# Patient Record
Sex: FEMALE | Race: WHITE
[De-identification: names, ages, dates, MRNs, and addresses within clinical notes are randomized per-mention and may not be internally consistent; named-entity substitution may affect disease eponyms.]

---

## 2020-05-08 ENCOUNTER — HOSPITAL ENCOUNTER (EMERGENCY)
Dept: HOSPITAL 4 - SED | Age: 85
Discharge: ANOTHER HOSPITAL | End: 2020-05-08
Payer: MEDICARE

## 2020-05-08 VITALS — SYSTOLIC BLOOD PRESSURE: 114 MMHG

## 2020-05-08 VITALS — BODY MASS INDEX: 23.92 KG/M2 | HEIGHT: 62 IN | WEIGHT: 130 LBS

## 2020-05-08 VITALS — SYSTOLIC BLOOD PRESSURE: 110 MMHG
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
## 2020-05-08 DIAGNOSIS — S00.03XA: Primary | ICD-10-CM
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
## 2020-05-08 DIAGNOSIS — M25.552: ICD-10-CM
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
## 2020-05-08 LAB
ALBUMIN SERPL BCP-MCNC: 3.4 G/DL (ref 3.4–4.8)
ALT SERPL W P-5'-P-CCNC: 26 U/L (ref 12–78)
ANION GAP SERPL CALCULATED.3IONS-SCNC: 12 MMOL/L (ref 5–15)
AST SERPL W P-5'-P-CCNC: 32 U/L (ref 10–37)
BASOPHILS # BLD AUTO: 0.1 K/UL (ref 0–0.2)
BASOPHILS NFR BLD AUTO: 1.2 % (ref 0–2)
BILIRUB SERPL-MCNC: 0.7 MG/DL (ref 0–1)
BUN SERPL-MCNC: 29 MG/DL (ref 8–21)
CALCIUM SERPL-MCNC: 8.6 MG/DL (ref 8.4–11)
CHLORIDE SERPL-SCNC: 99 MMOL/L (ref 98–107)
CREAT SERPL-MCNC: 1.07 MG/DL (ref 0.55–1.3)
EOSINOPHIL # BLD AUTO: 0.2 K/UL (ref 0–0.4)
EOSINOPHIL NFR BLD AUTO: 3.4 % (ref 0–4)
ERYTHROCYTE [DISTWIDTH] IN BLOOD BY AUTOMATED COUNT: 26.4 % (ref 9–15)
GFR SERPL CREATININE-BSD FRML MDRD: (no result) ML/MIN (ref 90–?)
GLUCOSE SERPL-MCNC: 126 MG/DL (ref 70–99)
HCT VFR BLD AUTO: 33.3 % (ref 36–48)
HGB BLD-MCNC: 10.4 G/DL (ref 12–16)
INR PPP: 1.7 (ref 0.8–1.2)
LYMPHOCYTES # BLD AUTO: 1.5 K/UL (ref 1–5.5)
LYMPHOCYTES NFR BLD AUTO: 20.8 % (ref 20.5–51.5)
MCH RBC QN AUTO: 28 PG (ref 27–31)
MCHC RBC AUTO-ENTMCNC: 31 % (ref 32–36)
MCV RBC AUTO: 91 FL (ref 79–98)
MONOCYTES # BLD MANUAL: 0.8 K/UL (ref 0–1)
MONOCYTES # BLD MANUAL: 11.3 % (ref 1.7–9.3)
NEUTROPHILS # BLD AUTO: 4.6 K/UL (ref 1.8–7.7)
NEUTROPHILS NFR BLD AUTO: 63.3 % (ref 40–70)
PLATELET # BLD AUTO: 232 K/UL (ref 130–430)
POTASSIUM SERPL-SCNC: 4.1 MMOL/L (ref 3.5–5.1)
PROTHROMBIN TIME: 16.9 SECS (ref 9.5–12.5)
RBC # BLD AUTO: 3.66 MIL/UL (ref 4.2–6.2)
SODIUM SERPLBLD-SCNC: 133 MMOL/L (ref 136–145)
WBC # BLD AUTO: 7.2 K/UL (ref 4.8–10.8)

## 2020-05-08 PROCEDURE — 96375 TX/PRO/DX INJ NEW DRUG ADDON: CPT

## 2020-05-08 PROCEDURE — 85610 PROTHROMBIN TIME: CPT

## 2020-05-08 PROCEDURE — 70450 CT HEAD/BRAIN W/O DYE: CPT

## 2020-05-08 PROCEDURE — 96374 THER/PROPH/DIAG INJ IV PUSH: CPT

## 2020-05-08 PROCEDURE — 72125 CT NECK SPINE W/O DYE: CPT

## 2020-05-08 PROCEDURE — 80053 COMPREHEN METABOLIC PANEL: CPT

## 2020-05-08 PROCEDURE — 86901 BLOOD TYPING SEROLOGIC RH(D): CPT

## 2020-05-08 PROCEDURE — 85025 COMPLETE CBC W/AUTO DIFF WBC: CPT

## 2020-05-08 PROCEDURE — 36415 COLL VENOUS BLD VENIPUNCTURE: CPT

## 2020-05-08 PROCEDURE — 86900 BLOOD TYPING SEROLOGIC ABO: CPT

## 2020-05-08 PROCEDURE — 86886 COOMBS TEST INDIRECT TITER: CPT

## 2020-05-08 PROCEDURE — 72192 CT PELVIS W/O DYE: CPT

## 2020-05-08 PROCEDURE — 71045 X-RAY EXAM CHEST 1 VIEW: CPT

## 2020-05-08 PROCEDURE — 93005 ELECTROCARDIOGRAM TRACING: CPT

## 2020-05-08 PROCEDURE — 85730 THROMBOPLASTIN TIME PARTIAL: CPT

## 2020-05-08 PROCEDURE — 99285 EMERGENCY DEPT VISIT HI MDM: CPT

## 2020-05-08 NOTE — NUR
report received from Jodie GAVIRIA. Pt is awake and alert. PT is s/p fall. 
Deformity to the LLE observed. LLE is shortened and rotated outward. Pt has two 
skin tears to BUE extremities. Medicated w/ Morphine and Zofran IVP. Will 
continue to monitor.

## 2020-05-08 NOTE — NUR
Pt being transfered to [facility] under the care of Dr. Reyes. 
Report called to KHADRA Miller in the ER.

## 2020-05-08 NOTE — NUR
Patient to be transferred to [facility].  Is being transferred due to 
higher level of care.  Receiving facility has accepting physician and available 
space. ER physician has signed transfer form.  Patient or responsible party has 
agreed to transfer and signed form.  Patient belongings inventoried and will be 
sent with patient.  Copy of nursing notes, lab reports, EKG, Physicians Orders 
and X-rays to be sent with patient.  Report called to KHADRA Miller at receiving 
facility. Receiving physician is Eric. [facility] has 
been called for transfer.

## 2020-05-08 NOTE — NUR
Patient BIB BLS alert and oriented x4 from home after a mechanical fall in her 
kitchen this morning. Patient reports she turned around and fell down flat on 
her back and injured her left hip. Patient states she hit her head on the floor 
but denies loss of consciousness. Patient states she lost her balance prior to 
the fall and denies dizziness. Patient left leg appear to be shortened and 
rotated outward. Patient appears to have two skin tears on her right and left 
forearms. Patient rates her pain a 10 out of 10.  Patients has a  hx of skin 
cancer, congestive heart failure, and low blood pressure. Patient does not have 
any other medical complaints at this time. Will continue to monitor.

## 2020-05-08 NOTE — NUR
# 20 gauge angiocath placed to R forearm.  Use of asceptic technique.  Opsite 
placed over site.  Blood return noted.  Blood for lab drawn from site.  Flushed 
with 10 cc of normal saline.  No evidence of infiltration noted.  Patient 
tolerated well.